# Patient Record
Sex: FEMALE | Race: WHITE | URBAN - METROPOLITAN AREA
[De-identification: names, ages, dates, MRNs, and addresses within clinical notes are randomized per-mention and may not be internally consistent; named-entity substitution may affect disease eponyms.]

---

## 2017-08-30 ENCOUNTER — IMPORTED ENCOUNTER (OUTPATIENT)
Dept: URBAN - METROPOLITAN AREA CLINIC 50 | Facility: CLINIC | Age: 72
End: 2017-08-30

## 2019-08-01 ENCOUNTER — IMPORTED ENCOUNTER (OUTPATIENT)
Dept: URBAN - METROPOLITAN AREA CLINIC 50 | Facility: CLINIC | Age: 74
End: 2019-08-01

## 2019-08-01 NOTE — PATIENT DISCUSSION
"""Continue Artificial tears both eyes two - four times a day ."" ""Continue gel tears both eyes at bedtime ."""

## 2020-08-04 ENCOUNTER — IMPORTED ENCOUNTER (OUTPATIENT)
Dept: URBAN - METROPOLITAN AREA CLINIC 50 | Facility: CLINIC | Age: 75
End: 2020-08-04

## 2021-04-18 ASSESSMENT — VISUAL ACUITY
OS_BAT: 20/70
OD_OTHER: 20/50. 20/100.
OD_PH: 20/30
OS_SC: 20/25
OS_CC: J1@ 16 IN
OD_SC: 20/40+
OS_SC: 20/25-2
OD_BAT: 20/25
OS_OTHER: 20/25. 20/30.
OS_BAT: 20/25
OD_SC: 20/40
OS_SC: 20/25
OS_BAT: 20/25
OS_OTHER: 20/70. 20/100.
OS_CC: J1+@ 16 IN
OD_CC: J1+@ 16 IN
OD_SC: 20/40
OD_BAT: 20/25
OD_CC: J1@ 16 IN
OD_OTHER: 20/25. 20/30.
OD_PH: 20/25
OD_OTHER: 20/25. 20/30.
OD_BAT: 20/50
OS_OTHER: 20/25. 20/30.

## 2021-04-18 ASSESSMENT — TONOMETRY
OD_IOP_MMHG: 15
OS_IOP_MMHG: 12
OD_IOP_MMHG: 13
OS_IOP_MMHG: 13
OS_IOP_MMHG: 15
OD_IOP_MMHG: 12

## 2021-07-30 ENCOUNTER — PREPPED CHART (OUTPATIENT)
Dept: URBAN - METROPOLITAN AREA CLINIC 48 | Facility: CLINIC | Age: 76
End: 2021-07-30

## 2021-08-11 ENCOUNTER — COMPREHENSIVE EXAM (OUTPATIENT)
Dept: URBAN - METROPOLITAN AREA CLINIC 48 | Facility: CLINIC | Age: 76
End: 2021-08-11

## 2021-08-11 DIAGNOSIS — H43.813: ICD-10-CM

## 2021-08-11 DIAGNOSIS — H43.393: ICD-10-CM

## 2021-08-11 DIAGNOSIS — H26.493: ICD-10-CM

## 2021-08-11 DIAGNOSIS — H04.123: ICD-10-CM

## 2021-08-11 DIAGNOSIS — H35.373: ICD-10-CM

## 2021-08-11 DIAGNOSIS — Z96.1: ICD-10-CM

## 2021-08-11 PROCEDURE — 92015 DETERMINE REFRACTIVE STATE: CPT

## 2021-08-11 PROCEDURE — 92134 CPTRZ OPH DX IMG PST SGM RTA: CPT

## 2021-08-11 PROCEDURE — 92014 COMPRE OPH EXAM EST PT 1/>: CPT

## 2021-08-11 ASSESSMENT — KERATOMETRY
OS_K2POWER_DIOPTERS: 45.25
OS_AXISANGLE2_DEGREES: 180
OD_AXISANGLE_DEGREES: 095
OD_K1POWER_DIOPTERS: 47.75
OS_K1POWER_DIOPTERS: 46.50
OD_AXISANGLE2_DEGREES: 5
OD_K2POWER_DIOPTERS: 44.75
OS_AXISANGLE_DEGREES: 090

## 2021-08-11 ASSESSMENT — VISUAL ACUITY
OU_CC: J1+
OU_SC: 20/25
OS_SC: 20/25-2
OD_PH: 20/30
OD_SC: 20/40

## 2021-08-11 ASSESSMENT — TONOMETRY
OD_IOP_MMHG: 15
OS_IOP_MMHG: 15

## 2022-08-08 ENCOUNTER — COMPREHENSIVE EXAM (OUTPATIENT)
Dept: URBAN - METROPOLITAN AREA CLINIC 48 | Facility: LOCATION | Age: 77
End: 2022-08-08

## 2022-08-08 DIAGNOSIS — H35.373: ICD-10-CM

## 2022-08-08 DIAGNOSIS — H43.813: ICD-10-CM

## 2022-08-08 DIAGNOSIS — H04.123: ICD-10-CM

## 2022-08-08 PROCEDURE — 92134 CPTRZ OPH DX IMG PST SGM RTA: CPT

## 2022-08-08 PROCEDURE — 92014 COMPRE OPH EXAM EST PT 1/>: CPT

## 2022-08-08 ASSESSMENT — VISUAL ACUITY
OD_SC: 20/40
OD_GLARE: 20/50
OD_GLARE: 20/50
OS_GLARE: 20/40
OS_SC: 20/30-2
OS_PH: 20/25-2
OS_GLARE: 20/40
OD_PH: 20/30-1
OU_SC: J2-

## 2022-08-08 ASSESSMENT — KERATOMETRY
OD_AXISANGLE_DEGREES: 095
OD_AXISANGLE2_DEGREES: 5
OS_K1POWER_DIOPTERS: 46.50
OS_K2POWER_DIOPTERS: 45.25
OS_AXISANGLE2_DEGREES: 180
OS_AXISANGLE_DEGREES: 090
OD_K1POWER_DIOPTERS: 47.75
OD_K2POWER_DIOPTERS: 44.75

## 2022-08-08 ASSESSMENT — TONOMETRY
OS_IOP_MMHG: 15
OD_IOP_MMHG: 15
